# Patient Record
Sex: FEMALE | Race: BLACK OR AFRICAN AMERICAN
[De-identification: names, ages, dates, MRNs, and addresses within clinical notes are randomized per-mention and may not be internally consistent; named-entity substitution may affect disease eponyms.]

---

## 2017-07-17 ENCOUNTER — HOSPITAL ENCOUNTER (OUTPATIENT)
Dept: HOSPITAL 35 - RAD | Age: 57
End: 2017-07-17
Attending: OBSTETRICS & GYNECOLOGY
Payer: COMMERCIAL

## 2017-07-17 DIAGNOSIS — Z12.31: Primary | ICD-10-CM

## 2017-07-21 ENCOUNTER — HOSPITAL ENCOUNTER (OUTPATIENT)
Dept: HOSPITAL 35 - ULTRA | Age: 57
End: 2017-07-21
Attending: OBSTETRICS & GYNECOLOGY
Payer: COMMERCIAL

## 2017-07-21 DIAGNOSIS — N63: Primary | ICD-10-CM

## 2017-08-24 ENCOUNTER — HOSPITAL ENCOUNTER (OUTPATIENT)
Dept: HOSPITAL 35 - ULTRA | Age: 57
Discharge: HOME | End: 2017-08-24
Attending: OBSTETRICS & GYNECOLOGY
Payer: COMMERCIAL

## 2017-08-24 DIAGNOSIS — D24.1: Primary | ICD-10-CM

## 2018-02-14 ENCOUNTER — HOSPITAL ENCOUNTER (OUTPATIENT)
Dept: HOSPITAL 35 - ULTRA | Age: 58
End: 2018-02-14
Attending: OBSTETRICS & GYNECOLOGY
Payer: COMMERCIAL

## 2018-02-14 DIAGNOSIS — N63.10: Primary | ICD-10-CM

## 2018-06-12 ENCOUNTER — HOSPITAL ENCOUNTER (OUTPATIENT)
Dept: HOSPITAL 35 - RAD | Age: 58
End: 2018-06-12
Attending: OBSTETRICS & GYNECOLOGY
Payer: COMMERCIAL

## 2018-06-12 DIAGNOSIS — N64.4: ICD-10-CM

## 2018-06-12 DIAGNOSIS — R92.8: Primary | ICD-10-CM

## 2018-12-27 ENCOUNTER — HOSPITAL ENCOUNTER (OUTPATIENT)
Dept: HOSPITAL 35 - RAD | Age: 58
End: 2018-12-27
Attending: OBSTETRICS & GYNECOLOGY
Payer: COMMERCIAL

## 2018-12-27 DIAGNOSIS — N64.4: Primary | ICD-10-CM

## 2018-12-27 DIAGNOSIS — R92.2: ICD-10-CM

## 2020-02-27 ENCOUNTER — HOSPITAL ENCOUNTER (OUTPATIENT)
Dept: HOSPITAL 35 - RAD | Age: 60
End: 2020-02-27
Attending: OBSTETRICS & GYNECOLOGY
Payer: COMMERCIAL

## 2020-02-27 DIAGNOSIS — Z12.31: Primary | ICD-10-CM

## 2020-05-15 ENCOUNTER — HOSPITAL ENCOUNTER (OUTPATIENT)
Dept: HOSPITAL 35 - ULTRA | Age: 60
End: 2020-05-15
Attending: OBSTETRICS & GYNECOLOGY
Payer: COMMERCIAL

## 2020-05-15 DIAGNOSIS — N63.20: Primary | ICD-10-CM

## 2020-08-14 ENCOUNTER — HOSPITAL ENCOUNTER (OUTPATIENT)
Dept: HOSPITAL 35 - CAT | Age: 60
End: 2020-08-14
Attending: OBSTETRICS & GYNECOLOGY
Payer: COMMERCIAL

## 2020-08-14 DIAGNOSIS — R10.9: Primary | ICD-10-CM

## 2020-08-14 LAB
BUN SERPL-MCNC: 12 MG/DL (ref 7–18)
CREAT SERPL-MCNC: 0.7 MG/DL (ref 0.6–1)

## 2020-08-21 ENCOUNTER — HOSPITAL ENCOUNTER (OUTPATIENT)
Dept: HOSPITAL 35 - CAT | Age: 60
End: 2020-08-21
Attending: OBSTETRICS & GYNECOLOGY
Payer: COMMERCIAL

## 2020-08-21 DIAGNOSIS — N85.2: Primary | ICD-10-CM

## 2020-08-21 DIAGNOSIS — D25.9: ICD-10-CM

## 2021-04-06 ENCOUNTER — HOSPITAL ENCOUNTER (OUTPATIENT)
Dept: HOSPITAL 35 - BC | Age: 61
End: 2021-04-06
Attending: OBSTETRICS & GYNECOLOGY
Payer: COMMERCIAL

## 2021-04-06 DIAGNOSIS — Z12.31: Primary | ICD-10-CM

## 2022-02-10 ENCOUNTER — HOSPITAL ENCOUNTER (EMERGENCY)
Dept: HOSPITAL 35 - ER | Age: 62
Discharge: HOME | End: 2022-02-10
Payer: COMMERCIAL

## 2022-02-10 VITALS — BODY MASS INDEX: 31.89 KG/M2 | HEIGHT: 63 IN | WEIGHT: 180.01 LBS

## 2022-02-10 VITALS — SYSTOLIC BLOOD PRESSURE: 149 MMHG | DIASTOLIC BLOOD PRESSURE: 62 MMHG

## 2022-02-10 DIAGNOSIS — R06.02: Primary | ICD-10-CM

## 2022-02-10 DIAGNOSIS — E11.9: ICD-10-CM

## 2022-02-10 DIAGNOSIS — R07.89: ICD-10-CM

## 2022-02-10 LAB
ALBUMIN SERPL-MCNC: 3.9 G/DL (ref 3.4–5)
ALT SERPL-CCNC: 18 U/L (ref 14–59)
ANION GAP SERPL CALC-SCNC: 11 MMOL/L (ref 7–16)
AST SERPL-CCNC: 10 U/L (ref 15–37)
BASOPHILS NFR BLD AUTO: 1 % (ref 0–2)
BILIRUB SERPL-MCNC: 0.3 MG/DL (ref 0.2–1)
BUN SERPL-MCNC: 11 MG/DL (ref 7–18)
CALCIUM SERPL-MCNC: 9.3 MG/DL (ref 8.5–10.1)
CHLORIDE SERPL-SCNC: 101 MMOL/L (ref 98–107)
CO2 SERPL-SCNC: 25 MMOL/L (ref 21–32)
CREAT SERPL-MCNC: 0.8 MG/DL (ref 0.6–1)
EOSINOPHIL NFR BLD: 0.4 % (ref 0–3)
ERYTHROCYTE [DISTWIDTH] IN BLOOD BY AUTOMATED COUNT: 13.4 % (ref 10.5–14.5)
GLUCOSE SERPL-MCNC: 137 MG/DL (ref 74–106)
GRANULOCYTES NFR BLD MANUAL: 60.9 % (ref 36–66)
HCT VFR BLD CALC: 36.3 % (ref 37–47)
HGB BLD-MCNC: 12.6 GM/DL (ref 12–15)
LYMPHOCYTES NFR BLD AUTO: 29 % (ref 24–44)
MCH RBC QN AUTO: 29 PG (ref 26–34)
MCHC RBC AUTO-ENTMCNC: 34.7 G/DL (ref 28–37)
MCV RBC: 83.6 FL (ref 80–100)
MONOCYTES NFR BLD: 8.7 % (ref 1–8)
NEUTROPHILS # BLD: 5.5 THOU/UL (ref 1.4–8.2)
PLATELET # BLD: 323 THOU/UL (ref 150–400)
POTASSIUM SERPL-SCNC: 4.2 MMOL/L (ref 3.5–5.1)
PROT SERPL-MCNC: 7.3 G/DL (ref 6.4–8.2)
RBC # BLD AUTO: 4.35 MIL/UL (ref 4.2–5)
SODIUM SERPL-SCNC: 137 MMOL/L (ref 136–145)
WBC # BLD AUTO: 9.1 THOU/UL (ref 4–11)

## 2023-07-26 NOTE — EKG
[place] [address] [employer]
[place]  [address]
Phone:  [phone]                    ELECTROCARDIOGRAM REPORT      
_______________________________________________________________________________
 
Name:       CANDELARIA MYERS                [facility]                     [id number]     Account #:      [id number]  
Admission:  02/10/22    Attend Phys:                          
Discharge:  02/10/22    Date of Birth:  [birth year]  
                                                          Report #: [id number]
   [id number]
_______________________________________________________________________________
                         [facility] ED
                                       
Test Date:    2022-02-10               Test Time:    10:54:04
Pat Name:     CANDELARIA MYERS             Department:   
Patient ID:   [id number]            Room:          
Gender:       F                        Technician:   
:          1960               Requested By: Dalton Royal
Order Number: [id number] MD:   José Miguel Wilder
                                 Measurements
Intervals                              Axis          
Rate:         73                       P:            28
IA:           151                      QRS:          -19
QRSD:         93                       T:            3
QT:           400                                    
QTc:          441                                    
                           Interpretive Statements
Sinus rhythm
Borderline left axis deviation
Compared to ECG 2017 13:55:04
No significant changes
Electronically Signed On 2- 12:27:11 CST by José Miguel Wilder
[email or web address]
 
 
 
 
 
 
 
 
 
 
 
 
 
 
 
 
 
 
 
 
 
  <ELECTRONICALLY SIGNED>
   By: José Miguel Wilder MD, [facility]    
  02/10/22     1227
D: 02/10/22 1054                           _____________________________________
T: 02/10/22 1054                           José Miguel Wilder MD, FACC      /EPI
no cough